# Patient Record
Sex: MALE | Race: BLACK OR AFRICAN AMERICAN | NOT HISPANIC OR LATINO | Employment: FULL TIME | URBAN - METROPOLITAN AREA
[De-identification: names, ages, dates, MRNs, and addresses within clinical notes are randomized per-mention and may not be internally consistent; named-entity substitution may affect disease eponyms.]

---

## 2024-06-23 ENCOUNTER — APPOINTMENT (EMERGENCY)
Dept: RADIOLOGY | Facility: HOSPITAL | Age: 49
End: 2024-06-23

## 2024-06-23 ENCOUNTER — HOSPITAL ENCOUNTER (EMERGENCY)
Facility: HOSPITAL | Age: 49
Discharge: HOME | End: 2024-06-23
Attending: EMERGENCY MEDICINE

## 2024-06-23 VITALS
HEIGHT: 72 IN | BODY MASS INDEX: 27.09 KG/M2 | TEMPERATURE: 98 F | DIASTOLIC BLOOD PRESSURE: 68 MMHG | SYSTOLIC BLOOD PRESSURE: 113 MMHG | HEART RATE: 59 BPM | RESPIRATION RATE: 18 BRPM | WEIGHT: 200 LBS | OXYGEN SATURATION: 100 %

## 2024-06-23 DIAGNOSIS — M25.559 HIP PAIN: Primary | ICD-10-CM

## 2024-06-23 DIAGNOSIS — M79.10 MUSCLE ACHE: ICD-10-CM

## 2024-06-23 PROCEDURE — 99283 EMERGENCY DEPT VISIT LOW MDM: CPT

## 2024-06-23 PROCEDURE — 73521 X-RAY EXAM HIPS BI 2 VIEWS: CPT

## 2024-06-23 PROCEDURE — 96372 THER/PROPH/DIAG INJ SC/IM: CPT

## 2024-06-23 PROCEDURE — 99284 EMERGENCY DEPT VISIT MOD MDM: CPT

## 2024-06-23 RX ORDER — TIZANIDINE 2 MG/1
2 TABLET ORAL ONCE
Status: COMPLETED | OUTPATIENT
Start: 2024-06-23 | End: 2024-06-23

## 2024-06-23 RX ORDER — KETOROLAC TROMETHAMINE 30 MG/ML
15 INJECTION, SOLUTION INTRAMUSCULAR; INTRAVENOUS ONCE
Status: COMPLETED | OUTPATIENT
Start: 2024-06-23 | End: 2024-06-23

## 2024-06-23 RX ORDER — ACETAMINOPHEN 325 MG/1
650 TABLET ORAL ONCE
Status: COMPLETED | OUTPATIENT
Start: 2024-06-23 | End: 2024-06-23

## 2024-06-23 RX ORDER — TIZANIDINE HYDROCHLORIDE 2 MG/1
2 CAPSULE, GELATIN COATED ORAL 3 TIMES DAILY
Qty: 9 CAPSULE | Refills: 0 | Status: SHIPPED | OUTPATIENT
Start: 2024-06-23 | End: 2024-06-26

## 2024-06-23 RX ORDER — LIDOCAINE 50 MG/G
1 PATCH TOPICAL ONCE
Status: DISCONTINUED | OUTPATIENT
Start: 2024-06-23 | End: 2024-06-23 | Stop reason: HOSPADM

## 2024-06-23 RX ORDER — LIDOCAINE 50 MG/G
1 PATCH TOPICAL DAILY
Qty: 5 PATCH | Refills: 0 | Status: SHIPPED | OUTPATIENT
Start: 2024-06-23 | End: 2024-06-28

## 2024-06-23 RX ADMIN — LIDOCAINE 1 PATCH: 50 PATCH CUTANEOUS at 04:11

## 2024-06-23 RX ADMIN — TIZANIDINE 2 MG: 2 TABLET ORAL at 04:11

## 2024-06-23 RX ADMIN — KETOROLAC TROMETHAMINE 15 MG: 30 INJECTION, SOLUTION INTRAMUSCULAR; INTRAVENOUS at 04:11

## 2024-06-23 RX ADMIN — ACETAMINOPHEN 650 MG: 325 TABLET, FILM COATED ORAL at 04:11

## 2024-06-23 NOTE — Clinical Note
Chay Kay was seen and treated in our emergency department on 6/23/2024.                Diagnosis:     Chay  may return to work on return date.    He may return on this date:          If you have any questions or concerns, please don't hesitate to call.      Keara Stevens PA-C    ______________________________           _______________          _______________  Hospital Representative                              Date                                Time

## 2024-06-23 NOTE — ED PROVIDER NOTES
History  Chief Complaint   Patient presents with    Leg Pain     Pt reports right hip pain that has been going on for awhile. Pt reports waking up today and having pain in the right hip and couldn't walk.      Patient a 49-year-old male who presents to the emergency room for right hip pain.  Patient reports right hip pain that has been ongoing for few weeks.  Patient reports right hip pain worse this morning and pain with ambulation.  Denies any trauma or injury.  Denies any other symptoms.  No medications for symptoms.      Leg Pain  Associated symptoms: no back pain and no fever        Prior to Admission Medications   Prescriptions Last Dose Informant Patient Reported? Taking?   albuterol (2.5 mg/3 mL) 0.083 % nebulizer solution   No No   Sig: Take 3 mL (2.5 mg total) by nebulization every 6 (six) hours as needed for wheezing or shortness of breath   albuterol (ProAir HFA) 90 mcg/act inhaler   No No   Sig: Inhale 2 puffs every 6 (six) hours as needed for wheezing   chlorhexidine (PERIDEX) 0.12 % solution   No No   Sig: Apply 15 mL to the mouth or throat 2 (two) times a day Swish for one minute and spit, twice daily.   naproxen (Naprosyn) 500 mg tablet   No No   Sig: Take 1 tablet (500 mg total) by mouth 2 (two) times a day with meals As needed for facial pain and swelling      Facility-Administered Medications: None       History reviewed. No pertinent past medical history.    History reviewed. No pertinent surgical history.    History reviewed. No pertinent family history.  I have reviewed and agree with the history as documented.    E-Cigarette/Vaping    E-Cigarette Use Never User      E-Cigarette/Vaping Substances     Social History     Tobacco Use    Smoking status: Every Day     Current packs/day: 0.50     Types: Cigarettes    Smokeless tobacco: Never   Vaping Use    Vaping status: Never Used   Substance Use Topics    Alcohol use: Never    Drug use: Never       Review of Systems   Constitutional:  Negative  for chills and fever.   HENT:  Negative for ear pain, sore throat, trouble swallowing and voice change.    Eyes:  Negative for pain and visual disturbance.   Respiratory:  Negative for cough and shortness of breath.    Cardiovascular:  Negative for chest pain and palpitations.   Gastrointestinal:  Negative for abdominal pain, nausea and vomiting.   Genitourinary:  Negative for dysuria and hematuria.   Musculoskeletal:  Positive for arthralgias (Right hip pain). Negative for back pain.   Skin:  Negative for color change and rash.   Neurological:  Negative for dizziness, seizures, syncope and headaches.   Psychiatric/Behavioral:  Negative for confusion.    All other systems reviewed and are negative.      Physical Exam  Physical Exam  Vitals and nursing note reviewed.   Constitutional:       General: He is not in acute distress.     Appearance: He is well-developed.   HENT:      Head: Normocephalic and atraumatic.   Eyes:      Conjunctiva/sclera: Conjunctivae normal.   Cardiovascular:      Rate and Rhythm: Normal rate and regular rhythm.      Pulses: Normal pulses.      Heart sounds: No murmur heard.  Pulmonary:      Effort: Pulmonary effort is normal. No respiratory distress.      Breath sounds: Normal breath sounds.   Abdominal:      Palpations: Abdomen is soft.      Tenderness: There is no abdominal tenderness.   Musculoskeletal:         General: No swelling.      Cervical back: Neck supple.        Legs:    Skin:     General: Skin is warm and dry.      Capillary Refill: Capillary refill takes less than 2 seconds.   Neurological:      Mental Status: He is alert.   Psychiatric:         Mood and Affect: Mood normal.         Vital Signs  ED Triage Vitals   Temperature Pulse Respirations Blood Pressure SpO2   06/23/24 0152 06/23/24 0152 06/23/24 0152 06/23/24 0152 06/23/24 0152   98 °F (36.7 °C) 59 18 113/68 100 %      Temp Source Heart Rate Source Patient Position - Orthostatic VS BP Location FiO2 (%)   06/23/24 0152  06/23/24 0152 06/23/24 0152 06/23/24 0152 --   Oral Monitor Sitting Left arm       Pain Score       06/23/24 0154       10 - Worst Possible Pain           Vitals:    06/23/24 0152   BP: 113/68   Pulse: 59   Patient Position - Orthostatic VS: Sitting         Visual Acuity      ED Medications  Medications   lidocaine (LIDODERM) 5 % patch 1 patch (1 patch Topical Medication Applied 6/23/24 0411)   acetaminophen (TYLENOL) tablet 650 mg (650 mg Oral Given 6/23/24 0411)   ketorolac (TORADOL) injection 15 mg (15 mg Intramuscular Given 6/23/24 0411)   tiZANidine (ZANAFLEX) tablet 2 mg (2 mg Oral Given 6/23/24 0411)       Diagnostic Studies  Results Reviewed       None                   XR hips bilateral with ap pelvis 2 vw   ED Interpretation by Keara Stevens PA-C (06/23 0529)   No acute osseous abnormality                 Procedures  Procedures         ED Course                               SBIRT 22yo+      Flowsheet Row Most Recent Value   Initial Alcohol Screen: US AUDIT-C     1. How often do you have a drink containing alcohol? 0 Filed at: 06/23/2024 0156   2. How many drinks containing alcohol do you have on a typical day you are drinking?  0 Filed at: 06/23/2024 0156   3a. Male UNDER 65: How often do you have five or more drinks on one occasion? 0 Filed at: 06/23/2024 0156   3b. FEMALE Any Age, or MALE 65+: How often do you have 4 or more drinks on one occassion? 0 Filed at: 06/23/2024 0156   Audit-C Score 0 Filed at: 06/23/2024 0156   FERNANDO: How many times in the past year have you...    Used an illegal drug or used a prescription medication for non-medical reasons? Never Filed at: 06/23/2024 0156                      Medical Decision Making  49-year-old male presenting for right hip pain without injury or trauma.  Has been ongoing for few weeks.  Vital signs stable.  Tenderness to right-sided hip and surrounding musculature.  No overlying skin changes.  Pain with ambulation.  However, gait intact.  No other acute  physical exam findings.  No acute osseous abnormality on x-ray, per my read.  Symptom improvement after medical management.  Patient to follow-up with orthopedics, referral placed.  Discussed return precautions.  Patient to follow-up with his PCP and return to the emergency room for any worsening symptoms.  Patient understands and agrees to discharge plan.    Amount and/or Complexity of Data Reviewed  Radiology: ordered and independent interpretation performed.    Risk  OTC drugs.  Prescription drug management.             Disposition  Final diagnoses:   Hip pain   Muscle ache     Time reflects when diagnosis was documented in both MDM as applicable and the Disposition within this note       Time User Action Codes Description Comment    6/23/2024  5:33 AM Keara Stevens [M25.559] Hip pain     6/23/2024  5:36 AM Keara Stevens [M79.10] Muscle ache           ED Disposition       ED Disposition   Discharge    Condition   Stable    Date/Time   Sun Jun 23, 2024 0533    Comment   Chay Kay discharge to home/self care.                   Follow-up Information       Follow up With Specialties Details Why Contact Info Additional Information    Your PCP         [facility] Emergency Department Emergency Medicine Go to  If symptoms worsen [address] [id number]  [phone] [facility] Emergency Department, [address], [place], [address]            Discharge Medication List as of 6/23/2024  5:37 AM        START taking these medications    Details   lidocaine (Lidoderm) 5 % Apply 1 patch topically over 12 hours daily for 5 days Remove & Discard patch within 12 hours or as directed by MD, Starting Sun 6/23/2024, Until Fri 6/28/2024, Normal      TiZANidine (ZANAFLEX) 2 MG capsule Take 1 capsule (2 mg total) by mouth 3 (three) times a day for 3 days, Starting Sun 6/23/2024, Until Wed 6/26/2024, Normal           CONTINUE these  medications which have NOT CHANGED    Details   albuterol (2.5 mg/3 mL) 0.083 % nebulizer solution Take 3 mL (2.5 mg total) by nebulization every 6 (six) hours as needed for wheezing or shortness of breath, Starting Thu 2/3/2022, Normal      albuterol (ProAir HFA) 90 mcg/act inhaler Inhale 2 puffs every 6 (six) hours as needed for wheezing, Starting Thu 2/3/2022, Normal      chlorhexidine (PERIDEX) 0.12 % solution Apply 15 mL to the mouth or throat 2 (two) times a day Swish for one minute and spit, twice daily., Starting Thu 1/13/2022, Normal      naproxen (Naprosyn) 500 mg tablet Take 1 tablet (500 mg total) by mouth 2 (two) times a day with meals As needed for facial pain and swelling, Starting Thu 1/13/2022, Normal                 PDMP Review       None            ED Provider  Electronically Signed by             Keara Stevens PA-C  06/23/24 3028

## 2024-06-23 NOTE — DISCHARGE INSTRUCTIONS
Lidocaine patches as needed.  Tizanidine as needed for muscle spasms.  Over-the-counter Tylenol & Motrin.  Follow-up with orthopedics, referral placed.    Return to the emergency room for any worsening symptoms.

## 2024-06-25 VITALS
WEIGHT: 190.2 LBS | BODY MASS INDEX: 25.76 KG/M2 | HEIGHT: 72 IN | DIASTOLIC BLOOD PRESSURE: 66 MMHG | HEART RATE: 52 BPM | SYSTOLIC BLOOD PRESSURE: 110 MMHG

## 2024-06-25 DIAGNOSIS — S76.011A HIP STRAIN, RIGHT, INITIAL ENCOUNTER: ICD-10-CM

## 2024-06-25 DIAGNOSIS — S39.012A LUMBAR STRAIN, INITIAL ENCOUNTER: ICD-10-CM

## 2024-06-25 DIAGNOSIS — M25.652 JOINT STIFFNESS OF BOTH HIPS: ICD-10-CM

## 2024-06-25 DIAGNOSIS — M62.9 HAMSTRING TIGHTNESS OF BOTH LOWER EXTREMITIES: ICD-10-CM

## 2024-06-25 DIAGNOSIS — M54.16 RADICULOPATHY, LUMBAR REGION: Primary | ICD-10-CM

## 2024-06-25 DIAGNOSIS — M25.651 JOINT STIFFNESS OF BOTH HIPS: ICD-10-CM

## 2024-06-25 DIAGNOSIS — M53.3 SI (SACROILIAC) JOINT DYSFUNCTION: ICD-10-CM

## 2024-06-25 DIAGNOSIS — S76.012A HIP STRAIN, LEFT, INITIAL ENCOUNTER: ICD-10-CM

## 2024-06-25 PROCEDURE — 99203 OFFICE O/P NEW LOW 30 MIN: CPT | Performed by: FAMILY MEDICINE

## 2024-06-25 RX ORDER — PREDNISONE 20 MG/1
20 TABLET ORAL 2 TIMES DAILY WITH MEALS
Qty: 10 TABLET | Refills: 0 | Status: SHIPPED | OUTPATIENT
Start: 2024-06-25 | End: 2024-06-30

## 2024-06-25 NOTE — PROGRESS NOTES
Assessment/Plan:  Assessment & Plan   Diagnoses and all orders for this visit:    Radiculopathy, lumbar region  -     Ambulatory Referral to Orthopedic Surgery  -     predniSONE 20 mg tablet; Take 1 tablet (20 mg total) by mouth 2 (two) times a day with meals for 5 days  -     Ambulatory Referral to Physical Therapy; Future    Lumbar strain, initial encounter  -     Ambulatory Referral to Physical Therapy; Future    Hamstring tightness of both lower extremities  -     Ambulatory Referral to Physical Therapy; Future    Joint stiffness of both hips  -     Ambulatory Referral to Physical Therapy; Future    Hip strain, right, initial encounter  -     Ambulatory Referral to Physical Therapy; Future    Hip strain, left, initial encounter  -     Ambulatory Referral to Physical Therapy; Future    SI (sacroiliac) joint dysfunction  -     Ambulatory Referral to Physical Therapy; Future      49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  Discussed with patient physical exam, imaging studies, impression, and plan.  X-rays hip and pelvis are unremarkable for acute osseous abnormality or significant degenerative changes.  Physical exam lumbar spine unremarkable for midline or paraspinal tenderness.  He has limited range of motion rotating and side bend to both sides and with extension.  There is no appreciable tenderness at the lateral aspect the hips.  There is groin pain on the right with SHEMAR maneuver.  There is reproduction of SI joint pain with passive range of motion of the hips.  Straight leg raise is unremarkable bilaterally.  He has normal sensation both lower extremities.  He has hamstring tightness both lower extremities.  Clinical impression is that he has symptoms from lumbar spine pathology contributing to radiculopathy and from muscle strain.  I discussed treatment regimen of anti-inflammatory, supplements, and formal therapy.  Surgery not  warranted.  He is to take prednisone 20 mg twice daily with food for 5 days.  He is to take turmeric, tart cherry, and glucosamine supplements.  He is to consult with physical therapist to be provided home exercise program.  He will follow-up as needed.      Subjective:   Patient ID: Chay Dhillon is a 49 y.o. male.  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  He denies trauma or inciting event.  Pain described as gradual in onset, localized to low back, radiating to the lateral aspect both hips and thighs, worse with climbing ladders, associated cramping in the legs, and improved resting.  He states that symptoms fluctuate with certain times of right lower extremity may be bothersome and at other times left lower extremity may be bothersome.  He would take Tylenol to help with symptoms.  Symptoms intensified on the right lower extremity and he presented to the emergency room.  X-ray evaluation was unremarkable.  He was provided lidocaine patches and prescribed tizanidine and advised to follow-up with orthopedic care.  He denies bowel or bladder incontinence.    Hip Pain  This is a chronic problem. The current episode started more than 1 year ago. The problem occurs daily. The problem has been waxing and waning. Associated symptoms include arthralgias. Pertinent negatives include no joint swelling, numbness or weakness. The symptoms are aggravated by bending and twisting (Climbing). He has tried rest, position changes and acetaminophen for the symptoms. The treatment provided mild relief.           The following portions of the patient's history were reviewed and updated as appropriate: He  has no past medical history on file.  He has No Known Allergies..    Review of Systems   Musculoskeletal:  Positive for arthralgias. Negative for  joint swelling.   Neurological:  Negative for weakness and numbness.       Objective:  Vitals:    06/25/24 1201   BP: 110/66   Pulse: (!) 52   Weight: 86.3 kg (190 lb 3.2 oz)   Height: 6' (1.829 m)      Right Ankle Exam     Muscle Strength   Dorsiflexion:  5/5  Plantar flexion:  5/5       Left Ankle Exam     Muscle Strength   Dorsiflexion:  5/5   Plantar flexion:  5/5       Right Hip Exam     Tenderness   The patient is experiencing no tenderness.     Muscle Strength   Flexion: 5/5     Tests   SHEMAR: positive    Comments:  Negative FADDIR  Hamstring tightness      Left Hip Exam     Tenderness   The patient is experiencing no tenderness.     Muscle Strength   Flexion: 5/5     Tests   SHEMAR: negative    Comments:  Negative FADDIR  Hamstring tightness      Back Exam     Tenderness   The patient is experiencing no tenderness.     Range of Motion   Extension:  abnormal   Flexion:  normal   Lateral bend right:  abnormal   Lateral bend left:  abnormal   Rotation right:  abnormal   Rotation left:  abnormal     Muscle Strength   Right Quadriceps:  5/5   Left Quadriceps:  5/5     Tests   Straight leg raise right: negative  Straight leg raise left: negative    Other   Sensation: normal          Strength/Myotome Testing     Left Ankle/Foot   Dorsiflexion: 5  Plantar flexion: 5    Right Ankle/Foot   Dorsiflexion: 5  Plantar flexion: 5      Physical Exam  Vitals and nursing note reviewed.   Constitutional:       Appearance: Normal appearance. He is well-developed. He is not ill-appearing or diaphoretic.   HENT:      Head: Normocephalic and atraumatic.      Right Ear: External ear normal.      Left Ear: External ear normal.   Eyes:      Conjunctiva/sclera: Conjunctivae normal.   Neck:      Trachea: No tracheal deviation.   Cardiovascular:      Rate and Rhythm: Normal rate.   Pulmonary:      Effort: Pulmonary effort is normal. No respiratory distress.   Abdominal:      General: There is no distension.   Musculoskeletal:          General: No tenderness.      Lumbar back: Negative right straight leg raise test and negative left straight leg raise test.   Skin:     General: Skin is warm and dry.      Coloration: Skin is not jaundiced or pale.   Neurological:      Mental Status: He is alert and oriented to person, place, and time.   Psychiatric:         Mood and Affect: Mood normal.         Behavior: Behavior normal.         Thought Content: Thought content normal.         Judgment: Judgment normal.         I have personally reviewed pertinent films in PACS and my interpretation is  .  X-rays hip and pelvis are unremarkable for acute osseous abnormality or significant degenerative changes.

## 2024-06-25 NOTE — PATIENT INSTRUCTIONS
Over the counter vitamins:    - Turmeric vitamin at least 1000 mg daily    - Tart cherry vitamin at least 1000 mg daily    - Glucosamine-chondrointin 2-3 times daily     Rx: Prednisone 20 mg  - twice daily   - eat first  - 5 days  - makes you energetic

## 2024-07-08 ENCOUNTER — EVALUATION (OUTPATIENT)
Dept: PHYSICAL THERAPY | Facility: CLINIC | Age: 49
End: 2024-07-08

## 2024-07-08 DIAGNOSIS — M62.9 HAMSTRING TIGHTNESS OF BOTH LOWER EXTREMITIES: ICD-10-CM

## 2024-07-08 DIAGNOSIS — S39.012A LUMBAR STRAIN, INITIAL ENCOUNTER: ICD-10-CM

## 2024-07-08 DIAGNOSIS — M54.16 RADICULOPATHY, LUMBAR REGION: ICD-10-CM

## 2024-07-08 DIAGNOSIS — M53.3 SI (SACROILIAC) JOINT DYSFUNCTION: ICD-10-CM

## 2024-07-08 DIAGNOSIS — M25.652 JOINT STIFFNESS OF BOTH HIPS: ICD-10-CM

## 2024-07-08 DIAGNOSIS — S76.011A HIP STRAIN, RIGHT, INITIAL ENCOUNTER: ICD-10-CM

## 2024-07-08 DIAGNOSIS — M25.651 JOINT STIFFNESS OF BOTH HIPS: ICD-10-CM

## 2024-07-08 DIAGNOSIS — S76.012A HIP STRAIN, LEFT, INITIAL ENCOUNTER: ICD-10-CM

## 2024-07-08 PROCEDURE — 97110 THERAPEUTIC EXERCISES: CPT

## 2024-07-08 PROCEDURE — 97161 PT EVAL LOW COMPLEX 20 MIN: CPT

## 2024-07-08 NOTE — PROGRESS NOTES
PT Evaluation     Today's date: 2024  Patient name: Chay Dhillon  : 1975  MRN: [id number]  Referring provider: Coleen Draper*  Dx:   Encounter Diagnosis     ICD-10-CM    1. Radiculopathy, lumbar region  M54.16 Ambulatory Referral to Physical Therapy      2. Lumbar strain, initial encounter  S39.012A Ambulatory Referral to Physical Therapy      3. Hamstring tightness of both lower extremities  M62.9 Ambulatory Referral to Physical Therapy      4. Joint stiffness of both hips  M25.651 Ambulatory Referral to Physical Therapy    M25.652       5. Hip strain, right, initial encounter  S76.011A Ambulatory Referral to Physical Therapy      6. Hip strain, left, initial encounter  S76.012A Ambulatory Referral to Physical Therapy      7. SI (sacroiliac) joint dysfunction  M53.3 Ambulatory Referral to Physical Therapy          Start Time: 1715  Stop Time: 1800  Total time in clinic (min): 45 minutes    Assessment  Impairments: abnormal or restricted ROM, activity intolerance, impaired physical strength, lacks appropriate home exercise program and pain with function    Assessment details: Pt presents w. Bilateral hip and LBP. Pt has decreased B glute medius activation and decreased strength in the B hip ER and IR, R>L. Pt has decreased ROM in B hip ER/IR. Pt showed guarding in the B gluteal muscles, R>L, and quadratus lumborum, R>L. These deficits have lead to pain with movement reducing his ability to bend, lift, navigate stairs, walk for prolonged periods, and stand for prolonged periods. This limitation inhibit him from being able to complete work activities. Pt would benefit from skilled PT in order to address deficits and improve QOL.   Barriers to therapy: Self-pay.     Goals  STG: to be completed in 4 weeks  1. Pt will demonstrate good understanding and adherence to HEP.   2. Pt will decrease pain by 50% during SL activity.     LTG: to be completed by d/c   1. Pt will be improve B glute abd strength  to 4/5   2. Pt will decrease pain intensity w/ activity to < 3/10    Plan  Patient would benefit from: skilled physical therapy    Planned therapy interventions: manual therapy, home exercise program, therapeutic exercise, therapeutic activities and neuromuscular re-education    Frequency: Every other week  Duration in weeks: 12  Plan of Care beginning date: 2024  Plan of Care expiration date: 2024  Plan details: Pt is self-pay and can only come 1x 2 weeks leading to a longer POC to compensate for the decreased frequency.         Subjective Evaluation    History of Present Illness  Mechanism of injury: Insidious onset of chronic bilateral hip pain 1 yr prior.   Patient Goals  Patient goals for therapy: decreased pain    Pain  Current pain ratin  At best pain ratin  At worst pain rating: 10  Quality: needle-like, tight and radiating  Relieving factors: medications  Aggravating factors: walking, stair climbing and lifting      Diagnostic Tests  X-ray: normal  MRI studies: normal  Treatments  Previous treatment: medication        Objective     Active Range of Motion   Left Hip   Flexion: WFL  Abduction: WFL    Right Hip   Flexion: WFL and with pain  Abduction: WFL and with pain    Additional Active Range of Motion Details  Increased pain in the glute w/ SLS on the L     Passive Range of Motion   Left Hip   External rotation (prone): 15 degrees   Internal rotation (prone): 15 degrees     Right Hip   External rotation (prone): 10 degrees   Internal rotation (prone): 10 degrees with pain    Additional Passive Range of Motion Details  Painful and stiff on the R.     Strength/Myotome Testing     Left Hip   Planes of Motion   Abduction: 4  External rotation: 3  Internal rotation: 3    Right Hip   Planes of Motion   Abduction: 3  External rotation: 3  Internal rotation: 3    Additional Strength Details  Pain w/ resisted abd and ER.              Precautions: none  POC expires Unit limit Auth Expiration date  PT/OT/ST + Visit Limit?   9/30 Self pay (3)  9/30 Self pay.                            Visit/Unit Tracking  AUTH Status:  Date 7/8               Used 1               Remaining                      Manuals                                                                 Neuro Re-Ed                                                                                                        Ther Ex             Ehsan  3x10            Captain goldman 2x8            Seated IR w/ adductor squeeze.  3x12  RTB                                                                             Ther Activity                                       Gait Training                                       Modalities

## 2024-07-25 ENCOUNTER — HOSPITAL ENCOUNTER (EMERGENCY)
Facility: HOSPITAL | Age: 49
Discharge: HOME | End: 2024-07-25
Attending: EMERGENCY MEDICINE

## 2024-07-25 VITALS
TEMPERATURE: 97 F | DIASTOLIC BLOOD PRESSURE: 79 MMHG | SYSTOLIC BLOOD PRESSURE: 128 MMHG | HEART RATE: 59 BPM | OXYGEN SATURATION: 100 % | BODY MASS INDEX: 25.56 KG/M2 | RESPIRATION RATE: 18 BRPM | WEIGHT: 188.49 LBS

## 2024-07-25 DIAGNOSIS — H57.11 ACUTE RIGHT EYE PAIN: ICD-10-CM

## 2024-07-25 DIAGNOSIS — S05.01XA ABRASION OF RIGHT CORNEA, INITIAL ENCOUNTER: Primary | ICD-10-CM

## 2024-07-25 PROCEDURE — 99283 EMERGENCY DEPT VISIT LOW MDM: CPT

## 2024-07-25 PROCEDURE — 99284 EMERGENCY DEPT VISIT MOD MDM: CPT | Performed by: EMERGENCY MEDICINE

## 2024-07-25 RX ORDER — OFLOXACIN 3 MG/ML
2 SOLUTION/ DROPS OPHTHALMIC ONCE
Status: COMPLETED | OUTPATIENT
Start: 2024-07-25 | End: 2024-07-25

## 2024-07-25 RX ORDER — ACETAMINOPHEN 325 MG/1
975 TABLET ORAL ONCE
Status: COMPLETED | OUTPATIENT
Start: 2024-07-25 | End: 2024-07-25

## 2024-07-25 RX ORDER — IBUPROFEN 600 MG/1
600 TABLET ORAL ONCE
Status: COMPLETED | OUTPATIENT
Start: 2024-07-25 | End: 2024-07-25

## 2024-07-25 RX ORDER — TETRACAINE HYDROCHLORIDE 5 MG/ML
1 SOLUTION OPHTHALMIC ONCE
Status: COMPLETED | OUTPATIENT
Start: 2024-07-25 | End: 2024-07-25

## 2024-07-25 RX ORDER — ONDANSETRON 4 MG/1
4 TABLET, ORALLY DISINTEGRATING ORAL ONCE
Status: COMPLETED | OUTPATIENT
Start: 2024-07-25 | End: 2024-07-25

## 2024-07-25 RX ADMIN — OFLOXACIN 2 DROP: 3 SOLUTION/ DROPS OPHTHALMIC at 19:54

## 2024-07-25 RX ADMIN — TETRACAINE HYDROCHLORIDE 1 DROP: 5 SOLUTION OPHTHALMIC at 19:22

## 2024-07-25 RX ADMIN — FLUORESCEIN SODIUM 1 STRIP: 1 STRIP OPHTHALMIC at 19:22

## 2024-07-25 RX ADMIN — IBUPROFEN 600 MG: 600 TABLET, FILM COATED ORAL at 19:26

## 2024-07-25 RX ADMIN — ONDANSETRON 4 MG: 4 TABLET, ORALLY DISINTEGRATING ORAL at 19:54

## 2024-07-25 RX ADMIN — ACETAMINOPHEN 975 MG: 325 TABLET, FILM COATED ORAL at 19:26

## 2024-07-25 NOTE — ED PROVIDER NOTES
History  Chief Complaint   Patient presents with    Eye Redness     Pt reports was grinding metal this afternoon, and developed R eye redness and pain.      49-year-old male no significant reported past history presenting with right eye pain and irritation after grinding metal earlier today.  Patient reports he feels a foreign body like sensation in his eye.  Denies any visual changes such as blurred vision or double vision.  Reports mild headache.  Denies any chest pain shortness of breath.  Does not wear contacts.  Denies any other complaints.  Chart reviewed.    History reviewed. No pertinent past medical history.  Family History: non-contributory  Social History          Prior to Admission Medications   Prescriptions Last Dose Informant Patient Reported? Taking?   TiZANidine (ZANAFLEX) 2 MG capsule  Self No No   Sig: Take 1 capsule (2 mg total) by mouth 3 (three) times a day for 3 days   albuterol (2.5 mg/3 mL) 0.083 % nebulizer solution  Self No No   Sig: Take 3 mL (2.5 mg total) by nebulization every 6 (six) hours as needed for wheezing or shortness of breath   albuterol (ProAir HFA) 90 mcg/act inhaler  Self No No   Sig: Inhale 2 puffs every 6 (six) hours as needed for wheezing   chlorhexidine (PERIDEX) 0.12 % solution  Self No No   Sig: Apply 15 mL to the mouth or throat 2 (two) times a day Swish for one minute and spit, twice daily.   lidocaine (Lidoderm) 5 %  Self No No   Sig: Apply 1 patch topically over 12 hours daily for 5 days Remove & Discard patch within 12 hours or as directed by MD   Patient not taking: Reported on 6/25/2024   naproxen (Naprosyn) 500 mg tablet  Self No No   Sig: Take 1 tablet (500 mg total) by mouth 2 (two) times a day with meals As needed for facial pain and swelling      Facility-Administered Medications: None       History reviewed. No pertinent past medical history.    History reviewed. No pertinent surgical history.    History reviewed. No pertinent family history.  I have  reviewed and agree with the history as documented.    E-Cigarette/Vaping    E-Cigarette Use Never User      E-Cigarette/Vaping Substances     Social History     Tobacco Use    Smoking status: Every Day     Current packs/day: 0.50     Types: Cigarettes    Smokeless tobacco: Never   Vaping Use    Vaping status: Never Used   Substance Use Topics    Alcohol use: Never    Drug use: Never       Review of Systems   Constitutional:  Negative for appetite change, chills, diaphoresis, fever and unexpected weight change.   HENT:  Negative for congestion and rhinorrhea.    Eyes:  Positive for pain and redness. Negative for photophobia, discharge and visual disturbance.   Respiratory:  Negative for cough, chest tightness and shortness of breath.    Cardiovascular:  Negative for chest pain, palpitations and leg swelling.   Gastrointestinal:  Negative for abdominal distention, abdominal pain, blood in stool, constipation, diarrhea, nausea and vomiting.   Genitourinary:  Negative for dysuria and hematuria.   Musculoskeletal:  Negative for back pain, joint swelling, neck pain and neck stiffness.   Skin:  Negative for color change, pallor, rash and wound.   Neurological:  Negative for dizziness, syncope, weakness, light-headedness and headaches.   Psychiatric/Behavioral:  Negative for agitation.    All other systems reviewed and are negative.      Physical Exam  Physical Exam  Vitals and nursing note reviewed.   Constitutional:       General: He is not in acute distress.     Appearance: Normal appearance. He is well-developed. He is not ill-appearing, toxic-appearing or diaphoretic.   HENT:      Head: Normocephalic and atraumatic.      Nose: Nose normal. No congestion or rhinorrhea.      Mouth/Throat:      Mouth: Mucous membranes are moist.      Pharynx: Oropharynx is clear. No oropharyngeal exudate or posterior oropharyngeal erythema.   Eyes:      General: No scleral icterus.        Right eye: No discharge.         Left eye: No  discharge.      Extraocular Movements: Extraocular movements intact.      Pupils: Pupils are equal, round, and reactive to light.      Comments: Right eye conjunctival injection.  Small foreign body noted removed with Q-tip.   Neck:      Vascular: No JVD.      Trachea: No tracheal deviation.      Comments: Supple. Normal range of motion.   Cardiovascular:      Rate and Rhythm: Normal rate and regular rhythm.      Heart sounds: Normal heart sounds. No murmur heard.     No friction rub. No gallop.      Comments: Normal rate and regular rhythm  Pulmonary:      Effort: Pulmonary effort is normal. No respiratory distress.      Breath sounds: Normal breath sounds. No stridor. No wheezing or rales.      Comments: Clear to auscultation bilaterally  Chest:      Chest wall: No tenderness.   Abdominal:      General: Bowel sounds are normal. There is no distension.      Palpations: Abdomen is soft.      Tenderness: There is no abdominal tenderness. There is no right CVA tenderness, left CVA tenderness, guarding or rebound.      Comments: Soft, nontender, nondistended.  Normal bowel sounds throughout   Musculoskeletal:         General: No swelling, tenderness, deformity or signs of injury. Normal range of motion.      Cervical back: Normal range of motion and neck supple. No rigidity. No muscular tenderness.      Right lower leg: No edema.      Left lower leg: No edema.   Lymphadenopathy:      Cervical: No cervical adenopathy.   Skin:     General: Skin is warm and dry.      Coloration: Skin is not pale.      Findings: No erythema or rash.   Neurological:      General: No focal deficit present.      Mental Status: He is alert. Mental status is at baseline.      Sensory: No sensory deficit.      Motor: No weakness or abnormal muscle tone.      Coordination: Coordination normal.      Gait: Gait normal.      Comments: Alert.  Strength and sensation grossly intact.  Ambulatory without difficulty at baseline.    Psychiatric:          Behavior: Behavior normal.         Thought Content: Thought content normal.         Vital Signs  ED Triage Vitals [07/25/24 1919]   Temperature Pulse Respirations Blood Pressure SpO2   (!) 97 °F (36.1 °C) 59 18 128/79 100 %      Temp Source Heart Rate Source Patient Position - Orthostatic VS BP Location FiO2 (%)   Temporal -- -- -- --      Pain Score       10 - Worst Possible Pain           Vitals:    07/25/24 1919   BP: 128/79   Pulse: 59         Visual Acuity      ED Medications  Medications   tetracaine 0.5 % ophthalmic solution 1 drop (1 drop Left Eye Given by Other 7/25/24 1922)   fluorescein sodium sterile ophthalmic strip 1 strip (1 strip Left Eye Given by Other 7/25/24 1922)   acetaminophen (TYLENOL) tablet 975 mg (975 mg Oral Given 7/25/24 1926)   ibuprofen (MOTRIN) tablet 600 mg (600 mg Oral Given 7/25/24 1926)   ondansetron (ZOFRAN-ODT) dispersible tablet 4 mg (4 mg Oral Given 7/25/24 1954)   ofloxacin (OCUFLOX) 0.3 % ophthalmic solution 2 drop (2 drops Right Eye Given 7/25/24 1954)       Diagnostic Studies  Results Reviewed       None                   No orders to display              Procedures  Procedures         ED Course                                 SBIRT 20yo+      Flowsheet Row Most Recent Value   Initial Alcohol Screen: US AUDIT-C     1. How often do you have a drink containing alcohol? 0 Filed at: 07/25/2024 1925   2. How many drinks containing alcohol do you have on a typical day you are drinking?  0 Filed at: 07/25/2024 1925   3a. Male UNDER 65: How often do you have five or more drinks on one occasion? 0 Filed at: 07/25/2024 1925   Audit-C Score 0 Filed at: 07/25/2024 1925   FERNANDO: How many times in the past year have you...    Used an illegal drug or used a prescription medication for non-medical reasons? Never Filed at: 07/25/2024 1925                      Medical Decision Making  49-year-old male no significant reported past history presenting with right eye pain and irritation after  grinding metal earlier today.  Eye pain resolved with instillation of tetracaine drops.  Eye evaluated and concern for foreign body.  Visualized and removed small foreign body with a Q-tip.  Provided eyedrops and provided recommendations for their continued use.  Eye precautions. Discussed results and recommendations. Advised follow up PCP and eye doctor. Medication recommendations. Given instructions and return precautions. Patient/family at bedside acknowledged understanding of all written and verbal instructions and return precautions. Discharged.     Risk  OTC drugs.  Prescription drug management.                 Disposition  Final diagnoses:   Abrasion of right cornea, initial encounter   Acute right eye pain     Time reflects when diagnosis was documented in both MDM as applicable and the Disposition within this note       Time User Action Codes Description Comment    7/25/2024  7:54 PM Sergey Garcia Add [S05.01XA] Abrasion of right cornea, initial encounter     7/25/2024  7:56 PM Sergey Garcia [H57.11] Acute right eye pain           ED Disposition       ED Disposition   Discharge    Condition   Stable    Date/Time   Thu Jul 25, 2024 1957    Comment   Chay Dhillon discharge to home/self care.                   Follow-up Information       Follow up With Specialties Details Why Contact Info    [employer]  Call  for [phone]      [facility]  Schedule an appointment as soon as possible for a visit in 2 days  [address] [place] [address]  [phone]            Patient's Medications   Discharge Prescriptions    No medications on file       No discharge procedures on file.    PDMP Review       None            ED Provider  Electronically Signed by             Sergey Garcia MD  07/25/24 2013

## 2024-07-25 NOTE — DISCHARGE INSTRUCTIONS
Please follow up PCP and eye doctor. Recommend tylenol 650 mg and ibuprofen 600 mg every 6 hours as needed for pain. Please return for severe chest pain, significant shortness of breath, severely worsening symptoms, or any other concerning signs or symptoms. Please refer to the following documents for additional instructions and return precautions.     Instill 1 to 2 drops in affected eye(s) every 30 minutes while awake and every 4 to 6 hours at night for the first 2 days; beginning on day 3, instill 1 to 2 drops every hour while awake for 4 to 6 additional days; thereafter, 1 to 2 drops 4 times daily until clinical cure.

## 2024-07-29 ENCOUNTER — APPOINTMENT (OUTPATIENT)
Dept: PHYSICAL THERAPY | Facility: CLINIC | Age: 49
End: 2024-07-29

## 2024-11-08 ENCOUNTER — APPOINTMENT (EMERGENCY)
Dept: RADIOLOGY | Facility: HOSPITAL | Age: 49
End: 2024-11-08

## 2024-11-08 ENCOUNTER — HOSPITAL ENCOUNTER (EMERGENCY)
Facility: HOSPITAL | Age: 49
Discharge: HOME | End: 2024-11-08
Attending: EMERGENCY MEDICINE

## 2024-11-08 VITALS
RESPIRATION RATE: 18 BRPM | DIASTOLIC BLOOD PRESSURE: 74 MMHG | OXYGEN SATURATION: 100 % | TEMPERATURE: 98.7 F | HEART RATE: 51 BPM | SYSTOLIC BLOOD PRESSURE: 108 MMHG

## 2024-11-08 DIAGNOSIS — R07.9 CHEST PAIN: Primary | ICD-10-CM

## 2024-11-08 LAB
ALBUMIN SERPL BCG-MCNC: 4.6 G/DL (ref 3.5–5)
ALP SERPL-CCNC: 61 U/L (ref 34–104)
ALT SERPL W P-5'-P-CCNC: 25 U/L (ref 7–52)
ANION GAP SERPL CALCULATED.3IONS-SCNC: 5 MMOL/L (ref 4–13)
AST SERPL W P-5'-P-CCNC: 24 U/L (ref 13–39)
ATRIAL RATE: 62 BPM
BASOPHILS # BLD AUTO: 0.05 THOUSANDS/ÂΜL (ref 0–0.1)
BASOPHILS NFR BLD AUTO: 1 % (ref 0–1)
BILIRUB SERPL-MCNC: 0.76 MG/DL (ref 0.2–1)
BUN SERPL-MCNC: 14 MG/DL (ref 5–25)
CALCIUM SERPL-MCNC: 9.4 MG/DL (ref 8.4–10.2)
CARDIAC TROPONIN I PNL SERPL HS: <2 NG/L
CARDIAC TROPONIN I PNL SERPL HS: <2 NG/L
CHLORIDE SERPL-SCNC: 105 MMOL/L (ref 96–108)
CO2 SERPL-SCNC: 25 MMOL/L (ref 21–32)
CREAT SERPL-MCNC: 1.1 MG/DL (ref 0.6–1.3)
D DIMER PPP FEU-MCNC: 0.37 UG/ML FEU
EOSINOPHIL # BLD AUTO: 0.27 THOUSAND/ÂΜL (ref 0–0.61)
EOSINOPHIL NFR BLD AUTO: 4 % (ref 0–6)
ERYTHROCYTE [DISTWIDTH] IN BLOOD BY AUTOMATED COUNT: 14 % (ref 11.6–15.1)
GFR SERPL CREATININE-BSD FRML MDRD: 78 ML/MIN/1.73SQ M
GLUCOSE SERPL-MCNC: 104 MG/DL (ref 65–140)
HCT VFR BLD AUTO: 44.9 % (ref 36.5–49.3)
HGB BLD-MCNC: 14.4 G/DL (ref 12–17)
IMM GRANULOCYTES # BLD AUTO: 0.01 THOUSAND/UL (ref 0–0.2)
IMM GRANULOCYTES NFR BLD AUTO: 0 % (ref 0–2)
LYMPHOCYTES # BLD AUTO: 2.7 THOUSANDS/ÂΜL (ref 0.6–4.47)
LYMPHOCYTES NFR BLD AUTO: 43 % (ref 14–44)
MCH RBC QN AUTO: 27.7 PG (ref 26.8–34.3)
MCHC RBC AUTO-ENTMCNC: 32.1 G/DL (ref 31.4–37.4)
MCV RBC AUTO: 87 FL (ref 82–98)
MONOCYTES # BLD AUTO: 0.69 THOUSAND/ÂΜL (ref 0.17–1.22)
MONOCYTES NFR BLD AUTO: 11 % (ref 4–12)
NEUTROPHILS # BLD AUTO: 2.57 THOUSANDS/ÂΜL (ref 1.85–7.62)
NEUTS SEG NFR BLD AUTO: 41 % (ref 43–75)
NRBC BLD AUTO-RTO: 0 /100 WBCS
P AXIS: 62 DEGREES
PLATELET # BLD AUTO: 222 THOUSANDS/UL (ref 149–390)
PMV BLD AUTO: 10 FL (ref 8.9–12.7)
POTASSIUM SERPL-SCNC: 4.6 MMOL/L (ref 3.5–5.3)
PR INTERVAL: 202 MS
PROT SERPL-MCNC: 7.7 G/DL (ref 6.4–8.4)
QRS AXIS: 56 DEGREES
QRSD INTERVAL: 80 MS
QT INTERVAL: 386 MS
QTC INTERVAL: 391 MS
RBC # BLD AUTO: 5.19 MILLION/UL (ref 3.88–5.62)
SODIUM SERPL-SCNC: 135 MMOL/L (ref 135–147)
T WAVE AXIS: 49 DEGREES
VENTRICULAR RATE: 62 BPM
WBC # BLD AUTO: 6.29 THOUSAND/UL (ref 4.31–10.16)

## 2024-11-08 PROCEDURE — 99285 EMERGENCY DEPT VISIT HI MDM: CPT

## 2024-11-08 PROCEDURE — 99285 EMERGENCY DEPT VISIT HI MDM: CPT | Performed by: EMERGENCY MEDICINE

## 2024-11-08 PROCEDURE — 85379 FIBRIN DEGRADATION QUANT: CPT | Performed by: EMERGENCY MEDICINE

## 2024-11-08 PROCEDURE — 80053 COMPREHEN METABOLIC PANEL: CPT | Performed by: EMERGENCY MEDICINE

## 2024-11-08 PROCEDURE — 84484 ASSAY OF TROPONIN QUANT: CPT | Performed by: EMERGENCY MEDICINE

## 2024-11-08 PROCEDURE — 93005 ELECTROCARDIOGRAM TRACING: CPT

## 2024-11-08 PROCEDURE — 36415 COLL VENOUS BLD VENIPUNCTURE: CPT | Performed by: EMERGENCY MEDICINE

## 2024-11-08 PROCEDURE — 93010 ELECTROCARDIOGRAM REPORT: CPT | Performed by: INTERNAL MEDICINE

## 2024-11-08 PROCEDURE — 85025 COMPLETE CBC W/AUTO DIFF WBC: CPT | Performed by: EMERGENCY MEDICINE

## 2024-11-08 PROCEDURE — 96374 THER/PROPH/DIAG INJ IV PUSH: CPT

## 2024-11-08 PROCEDURE — 71046 X-RAY EXAM CHEST 2 VIEWS: CPT

## 2024-11-08 PROCEDURE — 96375 TX/PRO/DX INJ NEW DRUG ADDON: CPT

## 2024-11-08 RX ORDER — ACETAMINOPHEN 10 MG/ML
1000 INJECTION, SOLUTION INTRAVENOUS ONCE
Status: COMPLETED | OUTPATIENT
Start: 2024-11-08 | End: 2024-11-08

## 2024-11-08 RX ORDER — KETOROLAC TROMETHAMINE 30 MG/ML
15 INJECTION, SOLUTION INTRAMUSCULAR; INTRAVENOUS ONCE
Status: COMPLETED | OUTPATIENT
Start: 2024-11-08 | End: 2024-11-08

## 2024-11-08 RX ADMIN — ACETAMINOPHEN 1000 MG: 1000 INJECTION INTRAVENOUS at 11:55

## 2024-11-08 RX ADMIN — KETOROLAC TROMETHAMINE 15 MG: 30 INJECTION, SOLUTION INTRAMUSCULAR at 11:55

## 2024-11-08 NOTE — ED PROVIDER NOTES
ED Disposition       None          Assessment & Plan       Medical Decision Making  Patient is a 49-year-old male no past medical history presenting with chest pain.  Patient is well-appearing at bedside with stable vitals and in no acute distress.  He has equal pulses, no lower extremity edema, reproducible central chest pain, lungs clear to auscultation with no signs of respiratory distress and no other significant physical exam findings.  Will obtain cardiac workup to rule out ACS, electrolyte abnormalities, anemia, arrhythmia, pneumonia, pneumothorax, D-dimer to assess for pulmonary embolism, give pain control and reassess.    Amount and/or Complexity of Data Reviewed  Labs: ordered.  Radiology: ordered and independent interpretation performed.    Risk  Prescription drug management.        ED Course as of 11/08/24 1901   Fri Nov 08, 2024   1431 Workup unremarkable, patient did have bradycardia while at rest and mild hypotension but was mentating appropriately with no complaints and was able to tolerate ambulation without unsteadiness.  Have discussed return precautions and outpatient follow-up and patient likely has costochondritis.       Medications   ketorolac (TORADOL) injection 15 mg (has no administration in time range)   acetaminophen (Ofirmev) injection 1,000 mg (has no administration in time range)       ED Risk Strat Scores                           SBIRT 20yo+      Flowsheet Row Most Recent Value   Initial Alcohol Screen: US AUDIT-C     1. How often do you have a drink containing alcohol? 0 Filed at: 11/08/2024 1004   2. How many drinks containing alcohol do you have on a typical day you are drinking?  0 Filed at: 11/08/2024 1004   3a. Male UNDER 65: How often do you have five or more drinks on one occasion? 0 Filed at: 11/08/2024 1004   Audit-C Score 0 Filed at: 11/08/2024 1004   FERNANDO: How many times in the past year have you...    Used an illegal drug or used a prescription medication for  "non-medical reasons? Never Filed at: 11/08/2024 1004                            History of Present Illness       Chief Complaint   Patient presents with   • Chest Pain     Pt c/o mid chest pain that started last night, pt states it feels worse with movement        History reviewed. No pertinent past medical history.   History reviewed. No pertinent surgical history.   History reviewed. No pertinent family history.   Social History     Tobacco Use   • Smoking status: Every Day     Current packs/day: 0.50     Types: Cigarettes   • Smokeless tobacco: Never   Vaping Use   • Vaping status: Never Used   Substance Use Topics   • Alcohol use: Never   • Drug use: Never      E-Cigarette/Vaping   • E-Cigarette Use Never User       E-Cigarette/Vaping Substances      I have reviewed and agree with the history as documented.     Patient is a 49-year-old male no past medical history presenting with chest pain.  Patient states that since 8 PM yesterday evening he has had intermittent central nonradiating chest pain which is worse with deep breathing, coughing and movement.  Denies any falls or injuries.  Has not take any medication for it.  States that he was told that he had \"bruises on his heart\" after an accident several years ago and this feels somewhat similar but denies any recent trauma.  Denies any nausea or vomiting, fevers, dizziness, rashes, vision changes, dysuria, leg pain or swelling, dizziness.  Notes dry cough beginning last night as well.        Review of Systems   All other systems reviewed and are negative.          Objective       ED Triage Vitals [11/08/24 1003]   Temperature Pulse Blood Pressure Respirations SpO2 Patient Position - Orthostatic VS   98.7 °F (37.1 °C) 70 110/64 22 99 % Sitting      Temp src Heart Rate Source BP Location FiO2 (%) Pain Score    -- Monitor Left arm -- --      Vitals      Date and Time Temp Pulse SpO2 Resp BP Pain Score FACES Pain Rating User   11/08/24 1003 98.7 °F (37.1 °C) 70 99 " % 22 110/64 -- -- AS            Physical Exam  Vitals reviewed.   Constitutional:       General: He is not in acute distress.     Appearance: Normal appearance. He is not ill-appearing.   HENT:      Mouth/Throat:      Mouth: Mucous membranes are moist.   Eyes:      Conjunctiva/sclera: Conjunctivae normal.      Comments: Normal conjunctiva   Cardiovascular:      Rate and Rhythm: Normal rate and regular rhythm.      Pulses:           Radial pulses are 2+ on the right side and 2+ on the left side.      Heart sounds: Normal heart sounds.   Pulmonary:      Effort: Pulmonary effort is normal.      Breath sounds: Normal breath sounds.   Chest:      Chest wall: Tenderness present.   Abdominal:      General: Abdomen is flat.      Palpations: Abdomen is soft.      Tenderness: There is no abdominal tenderness.   Musculoskeletal:         General: No swelling. Normal range of motion.      Cervical back: Neck supple.      Right lower leg: No tenderness. No edema.      Left lower leg: No tenderness. No edema.   Skin:     General: Skin is warm and dry.   Neurological:      General: No focal deficit present.      Mental Status: He is alert.   Psychiatric:         Mood and Affect: Mood normal.         Results Reviewed       Procedure Component Value Units Date/Time    CBC and differential [[id number]]  (Abnormal) Collected: 11/08/24 1127    Lab Status: Final result Specimen: Blood from Arm, Left Updated: 11/08/24 1134     WBC 6.29 Thousand/uL      RBC 5.19 Million/uL      Hemoglobin 14.4 g/dL      Hematocrit 44.9 %      MCV 87 fL      MCH 27.7 pg      MCHC 32.1 g/dL      RDW 14.0 %      MPV 10.0 fL      Platelets 222 Thousands/uL      nRBC 0 /100 WBCs      Segmented % 41 %      Immature Grans % 0 %      Lymphocytes % 43 %      Monocytes % 11 %      Eosinophils Relative 4 %      Basophils Relative 1 %      Absolute Neutrophils 2.57 Thousands/µL      Absolute Immature Grans 0.01 Thousand/uL      Absolute Lymphocytes 2.70 Thousands/µL       Absolute Monocytes 0.69 Thousand/µL      Eosinophils Absolute 0.27 Thousand/µL      Basophils Absolute 0.05 Thousands/µL     Comprehensive metabolic panel [[id number]] Collected: 11/08/24 1127    Lab Status: In process Specimen: Blood from Arm, Left Updated: 11/08/24 1131    HS Troponin 0hr (reflex protocol) [[id number]] Collected: 11/08/24 1127    Lab Status: In process Specimen: Blood from Arm, Left Updated: 11/08/24 1131            XR chest 2 views    (Results Pending)       ECG 12 Lead Documentation Only    Date/Time: 11/8/2024 2:14 PM    Performed by: Svitlana Aviles DO  Authorized by: Svitlana Aviles DO    Patient location:  ED  Previous ECG:     Previous ECG:  Unavailable  Interpretation:     Interpretation: non-specific    Rate:     ECG rate assessment: bradycardic    Rhythm:     Rhythm: sinus rhythm    Ectopy:     Ectopy: none    QRS:     QRS axis:  Normal    QRS intervals:  Normal  Conduction:     Conduction: abnormal      Abnormal conduction: 1st degree    ST segments:     ST segments:  Normal  T waves:     T waves: normal        ED Medication and Procedure Management   Prior to Admission Medications   Prescriptions Last Dose Informant Patient Reported? Taking?   TiZANidine (ZANAFLEX) 2 MG capsule  Self No No   Sig: Take 1 capsule (2 mg total) by mouth 3 (three) times a day for 3 days   albuterol (2.5 mg/3 mL) 0.083 % nebulizer solution  Self No No   Sig: Take 3 mL (2.5 mg total) by nebulization every 6 (six) hours as needed for wheezing or shortness of breath   albuterol (ProAir HFA) 90 mcg/act inhaler  Self No No   Sig: Inhale 2 puffs every 6 (six) hours as needed for wheezing   chlorhexidine (PERIDEX) 0.12 % solution  Self No No   Sig: Apply 15 mL to the mouth or throat 2 (two) times a day Swish for one minute and spit, twice daily.   lidocaine (Lidoderm) 5 %  Self No No   Sig: Apply 1 patch topically over 12 hours daily for 5 days Remove & Discard patch within 12 hours or as directed by MD    Patient not taking: Reported on 6/25/2024   naproxen (Naprosyn) 500 mg tablet  Self No No   Sig: Take 1 tablet (500 mg total) by mouth 2 (two) times a day with meals As needed for facial pain and swelling      Facility-Administered Medications: None     Patient's Medications   Discharge Prescriptions    No medications on file     No discharge procedures on file.  ED SEPSIS DOCUMENTATION            Svitlana Aviles DO  11/08/24 6063

## 2024-11-09 LAB
ATRIAL RATE: 44 BPM
P AXIS: 64 DEGREES
PR INTERVAL: 234 MS
QRS AXIS: 47 DEGREES
QRSD INTERVAL: 78 MS
QT INTERVAL: 430 MS
QTC INTERVAL: 367 MS
T WAVE AXIS: 40 DEGREES
VENTRICULAR RATE: 44 BPM

## 2024-11-09 PROCEDURE — 93010 ELECTROCARDIOGRAM REPORT: CPT | Performed by: INTERNAL MEDICINE

## 2025-01-14 ENCOUNTER — HOSPITAL ENCOUNTER (EMERGENCY)
Facility: HOSPITAL | Age: 50
Discharge: HOME | End: 2025-01-14
Attending: EMERGENCY MEDICINE

## 2025-01-14 ENCOUNTER — APPOINTMENT (EMERGENCY)
Dept: CT IMAGING | Facility: HOSPITAL | Age: 50
End: 2025-01-14

## 2025-01-14 VITALS
TEMPERATURE: 97.9 F | SYSTOLIC BLOOD PRESSURE: 114 MMHG | OXYGEN SATURATION: 99 % | DIASTOLIC BLOOD PRESSURE: 62 MMHG | HEART RATE: 63 BPM | RESPIRATION RATE: 16 BRPM

## 2025-01-14 DIAGNOSIS — S09.90XA INJURY OF HEAD, INITIAL ENCOUNTER: Primary | ICD-10-CM

## 2025-01-14 PROCEDURE — 70450 CT HEAD/BRAIN W/O DYE: CPT

## 2025-01-14 PROCEDURE — 99284 EMERGENCY DEPT VISIT MOD MDM: CPT

## 2025-01-14 PROCEDURE — 99284 EMERGENCY DEPT VISIT MOD MDM: CPT | Performed by: EMERGENCY MEDICINE

## 2025-01-14 NOTE — ED PROVIDER NOTES
Time reflects when diagnosis was documented in both MDM as applicable and the Disposition within this note       Time User Action Codes Description Comment    1/14/2025  1:23 PM Kofi Mcclendon Add [S09.90XA] Injury of head, initial encounter           ED Disposition       ED Disposition   Discharge    Condition   Stable    Date/Time   Tue Jan 14, 2025  1:23 PM    Comment   Chay Atkinsonkes discharge to home/self care.                   Assessment & Plan       Medical Decision Making  Amount and/or Complexity of Data Reviewed  Radiology: ordered.             Medications - No data to display    ED Risk Strat Scores                          SBIRT 22yo+      Flowsheet Row Most Recent Value   Initial Alcohol Screen: US AUDIT-C     1. How often do you have a drink containing alcohol? 0 Filed at: 01/14/2025 1134   2. How many drinks containing alcohol do you have on a typical day you are drinking?  0 Filed at: 01/14/2025 1134   3a. Male UNDER 65: How often do you have five or more drinks on one occasion? 0 Filed at: 01/14/2025 1134   Audit-C Score 0 Filed at: 01/14/2025 1134   FERNANDO: How many times in the past year have you...    Used an illegal drug or used a prescription medication for non-medical reasons? Never Filed at: 01/14/2025 1134                            History of Present Illness       Chief Complaint   Patient presents with    Head Injury     Pt presents with c/o tripping and falling hitting L side of his head off a piece of wood, denies LOC or use of thinners.     Fall       History reviewed. No pertinent past medical history.   History reviewed. No pertinent surgical history.   History reviewed. No pertinent family history.   Social History     Tobacco Use    Smoking status: Every Day     Current packs/day: 0.50     Types: Cigarettes    Smokeless tobacco: Never   Vaping Use    Vaping status: Never Used   Substance Use Topics    Alcohol use: Never    Drug use: Never      E-Cigarette/Vaping    E-Cigarette Use Never  User       E-Cigarette/Vaping Substances      I have reviewed and agree with the history as documented.     HPI patient is a 49-year-old male he reports that he tripped falling and hitting the left side of his head on a piece of wood.  Denies any loss of consciousness but reports significant headache.  Denies any visual disturbance.  Patient has a patch over his right eye and he reports he has an eye injury and is waiting for a lens replacement.  He denies any focal weakness.  He denies difficulty walking.  Denies any nausea or vomiting.  Was concerned about intracranial pathology and felt he might need to be imaged  Past medical history previously healthy  Family history noncontributory  Social history, smoker, denies drug abuse    Review of Systems   Constitutional:  Negative for fever.   HENT:  Negative for congestion.    Eyes:  Negative for pain and redness.   Respiratory:  Negative for cough and shortness of breath.    Cardiovascular:  Negative for chest pain.   Gastrointestinal:  Negative for abdominal pain and vomiting.   Neurological:  Positive for headaches.           Objective       ED Triage Vitals   Temperature Pulse Blood Pressure Respirations SpO2 Patient Position - Orthostatic VS   01/14/25 1103 01/14/25 1103 01/14/25 1103 01/14/25 1103 01/14/25 1103 --   97.9 °F (36.6 °C) 63 114/62 16 99 %       Temp src Heart Rate Source BP Location FiO2 (%) Pain Score    -- -- -- -- 01/14/25 1157        6      Vitals      Date and Time Temp Pulse SpO2 Resp BP Pain Score FACES Pain Rating User   01/14/25 1157 -- -- -- -- -- 6 -- BH   01/14/25 1103 97.9 °F (36.6 °C) 63 99 % 16 114/62 -- -- AR            Physical Exam  Vitals and nursing note reviewed.   Constitutional:       Appearance: He is well-developed.   HENT:      Head: Normocephalic.      Comments: There is some mild left scalp tenderness     Right Ear: External ear normal.      Left Ear: External ear normal.      Nose: Nose normal.      Mouth/Throat:       Mouth: Mucous membranes are moist.      Pharynx: Oropharynx is clear.   Eyes:      General: Lids are normal.      Extraocular Movements: Extraocular movements intact.      Comments: Patient has an eye patch on his right eye he reports eye injury and apparently is waiting for a lens replacement.  The pupil is small but reactive.   Pulmonary:      Effort: Pulmonary effort is normal. No respiratory distress.   Musculoskeletal:         General: No deformity. Normal range of motion.      Cervical back: Normal range of motion and neck supple.   Skin:     General: Skin is warm and dry.   Neurological:      General: No focal deficit present.      Mental Status: He is alert and oriented to person, place, and time.      GCS: GCS eye subscore is 4. GCS verbal subscore is 5. GCS motor subscore is 6.      Cranial Nerves: Cranial nerves 2-12 are intact.      Sensory: Sensation is intact.      Motor: Motor function is intact.   Psychiatric:         Mood and Affect: Mood normal.         Results Reviewed       None            CT head without contrast   Final Interpretation by Ronaldo Rodriguez MD (01/14 1706)      No acute intracranial abnormality.                  Workstation performed: [id number]             Procedures    ED Medication and Procedure Management   Prior to Admission Medications   Prescriptions Last Dose Informant Patient Reported? Taking?   TiZANidine (ZANAFLEX) 2 MG capsule  Self No No   Sig: Take 1 capsule (2 mg total) by mouth 3 (three) times a day for 3 days   albuterol (2.5 mg/3 mL) 0.083 % nebulizer solution  Self No No   Sig: Take 3 mL (2.5 mg total) by nebulization every 6 (six) hours as needed for wheezing or shortness of breath   albuterol (ProAir HFA) 90 mcg/act inhaler  Self No No   Sig: Inhale 2 puffs every 6 (six) hours as needed for wheezing   chlorhexidine (PERIDEX) 0.12 % solution  Self No No   Sig: Apply 15 mL to the mouth or throat 2 (two) times a day Swish for one minute and spit, twice daily.    lidocaine (Lidoderm) 5 %  Self No No   Sig: Apply 1 patch topically over 12 hours daily for 5 days Remove & Discard patch within 12 hours or as directed by MD   Patient not taking: Reported on 6/25/2024   naproxen (Naprosyn) 500 mg tablet  Self No No   Sig: Take 1 tablet (500 mg total) by mouth 2 (two) times a day with meals As needed for facial pain and swelling      Facility-Administered Medications: None     Discharge Medication List as of 1/14/2025  1:24 PM        CONTINUE these medications which have NOT CHANGED    Details   albuterol (2.5 mg/3 mL) 0.083 % nebulizer solution Take 3 mL (2.5 mg total) by nebulization every 6 (six) hours as needed for wheezing or shortness of breath, Starting Thu 2/3/2022, Normal      albuterol (ProAir HFA) 90 mcg/act inhaler Inhale 2 puffs every 6 (six) hours as needed for wheezing, Starting Thu 2/3/2022, Normal      chlorhexidine (PERIDEX) 0.12 % solution Apply 15 mL to the mouth or throat 2 (two) times a day Swish for one minute and spit, twice daily., Starting Thu 1/13/2022, Normal      lidocaine (Lidoderm) 5 % Apply 1 patch topically over 12 hours daily for 5 days Remove & Discard patch within 12 hours or as directed by MD, Starting Sun 6/23/2024, Until Fri 6/28/2024, Normal      naproxen (Naprosyn) 500 mg tablet Take 1 tablet (500 mg total) by mouth 2 (two) times a day with meals As needed for facial pain and swelling, Starting Thu 1/13/2022, Normal      TiZANidine (ZANAFLEX) 2 MG capsule Take 1 capsule (2 mg total) by mouth 3 (three) times a day for 3 days, Starting Sun 6/23/2024, Until Wed 6/26/2024, Normal           No discharge procedures on file.  ED SEPSIS DOCUMENTATION   Time reflects when diagnosis was documented in both MDM as applicable and the Disposition within this note       Time User Action Codes Description Comment    1/14/2025  1:23 PM Kofi Mcclendon Add [S09.90XA] Injury of head, initial encounter                  Kofi Mcclendon MD  01/14/25 0288